# Patient Record
(demographics unavailable — no encounter records)

---

## 2025-02-12 NOTE — HISTORY OF PRESENT ILLNESS
[FreeTextEntry1] : Name MALICK JOSEPH MRN [id number]  1965 ------------------------------------------------------------------------------------------------------------------------------------------- Date of First Visit: 2024 Referring Provider/PCP: Dr. Marcia Spain ------------------------------------------------------------------------------------------------------------------------------------------- CC: Bothersome lower urinary tract symptoms (LUTS)  History of Present Illness: MALICK JOSEPH is a 59 year male with a several month history of bothersome LUTS. Specifically, he complains of frequency, noturia 6-7. No dysuria, hematuria, incomplete emptying. Current smoker - smokes 10 cigarettes per day for "his whole adult life". He drinks 1-2 cups of coffee per day. His PCP gave him Cipro and Flomax about 2 weeks ago when he experienced intermittency. Intermittency has now resolved, but he still is experiencing frequency and nocturia. He is not currently taking any medications for his symptoms.  PVR: 92  Other Relevant History: Previously taken medication for symptoms - Flomax Male sexual health/prostate supplements - None Prior history of urinary retention - No Prior episodes of prostatitis - No Prior urinary tract infections - No History of bladder stones - No History of prior prostate biopsies - No PSA History: None -------------------------------------------------------------------------------------------------------------------------------------------  Interval History (2025):  Here for follow up. Had been doing well with tamsulosin but ran out of medication about 3 weeks ago and symptoms have worsened again. He is bothered by urinary frequency, sensation of incomplete emptying, hesitancy. No dysuria, hematuria, urgency. Positive depression screen noted today. Continues to smoke cigarettes and consume caffeine.

## 2025-02-12 NOTE — REVIEW OF SYSTEMS
Last seen 8-14-19.  Patient still has lesion on right lower leg that is draining pus and denies any fever.  You diagnosed her with folliculitis.  Area is not warm or red to touch, but states dark brown in color.  Wants referral to dermatology.  Ok to give?   [see HPI] : see HPI [Negative] : Heme/Lymph

## 2025-02-12 NOTE — ASSESSMENT
[FreeTextEntry1] : Assessment: Mr. MALICK JOSEPH is a 59 y/o male with bothersome BPH/LUTS that have worsened after running out of tamsulosin. Stable when on treatment.   Plan: -Avoid bladder irritants - emphasis on smoking cessation and caffeine -Restart Flomax - Rx refill sent -Follow-up 1 year -Instructed to follow up with PCP for positive depression screen